# Patient Record
Sex: FEMALE | Race: WHITE | NOT HISPANIC OR LATINO
[De-identification: names, ages, dates, MRNs, and addresses within clinical notes are randomized per-mention and may not be internally consistent; named-entity substitution may affect disease eponyms.]

---

## 2018-08-30 ENCOUNTER — RECORDS - HEALTHEAST (OUTPATIENT)
Dept: ADMINISTRATIVE | Facility: OTHER | Age: 58
End: 2018-08-30

## 2018-08-31 ENCOUNTER — HOSPITAL ENCOUNTER (OUTPATIENT)
Dept: MAMMOGRAPHY | Facility: CLINIC | Age: 58
Discharge: HOME | End: 2018-08-31
Attending: NURSE PRACTITIONER

## 2018-08-31 DIAGNOSIS — N63.0 BREAST LUMP: ICD-10-CM

## 2018-10-30 ASSESSMENT — MIFFLIN-ST. JEOR: SCORE: 1353.33

## 2018-11-02 ENCOUNTER — ANESTHESIA - HEALTHEAST (OUTPATIENT)
Dept: SURGERY | Facility: AMBULATORY SURGERY CENTER | Age: 58
End: 2018-11-02

## 2018-11-05 ENCOUNTER — SURGERY - HEALTHEAST (OUTPATIENT)
Dept: SURGERY | Facility: AMBULATORY SURGERY CENTER | Age: 58
End: 2018-11-05

## 2018-11-05 ASSESSMENT — MIFFLIN-ST. JEOR: SCORE: 1376.01

## 2021-06-01 ENCOUNTER — RECORDS - HEALTHEAST (OUTPATIENT)
Dept: ADMINISTRATIVE | Facility: CLINIC | Age: 61
End: 2021-06-01

## 2021-06-02 VITALS — BODY MASS INDEX: 25.31 KG/M2 | WEIGHT: 167 LBS | HEIGHT: 68 IN

## 2021-06-21 NOTE — ANESTHESIA POSTPROCEDURE EVALUATION
Patient: Rahel Abdul  HYSTEROSCOPY, WITH DILATION AND CURETTAGE with myosure  Anesthesia type: MAC    Patient location: PACU  Last vitals:   Vitals:    11/05/18 0801   BP: 98/54   Pulse: 63   Resp: 16   Temp: 36.2  C (97.1  F)   SpO2: 100%     Post vital signs: stable  Level of consciousness: awake and responds to simple questions  Post-anesthesia pain: pain controlled  Post-anesthesia nausea and vomiting: no  Pulmonary: unassisted, return to baseline  Cardiovascular: stable and blood pressure at baseline  Hydration: adequate  Anesthetic events: no    QCDR Measures:  ASA# 11 - Brenda-op Cardiac Arrest: ASA11B - Patient did NOT experience unanticipated cardiac arrest  ASA# 12 - Brenda-op Mortality Rate: ASA12B - Patient did NOT die  ASA# 13 - PACU Re-Intubation Rate: NA - No ETT / LMA used for case  ASA# 10 - Composite Anes Safety: ASA10A - No serious adverse event    Additional Notes:

## 2021-06-21 NOTE — ANESTHESIA PREPROCEDURE EVALUATION
"Anesthesia Evaluation      Patient summary reviewed   No history of anesthetic complications     Airway   Mallampati: II   Pulmonary - negative ROS and normal exam                          Cardiovascular - negative ROS and normal exam  Rhythm: regular  Rate: normal,         Neuro/Psych    (+) depression, anxiety/panic attacks,     Endo/Other - negative ROS      GI/Hepatic/Renal - negative ROS           Dental - normal exam                        Anesthesia Plan  Planned anesthetic: MAC  Versed/fent  propofol ggt  Decadron/zofran  Patient states tylenol makes her face \"puff up\" so we'll avoid that  ASA 2     Anesthetic plan and risks discussed with: patient and child/children    Post-op plan: routine recovery          "

## 2021-06-21 NOTE — ANESTHESIA CARE TRANSFER NOTE
Last vitals:   Vitals:    11/05/18 0801   BP: 98/54   Pulse: 63   Resp: 16   Temp: 36.2  C (97.1  F)   SpO2: 100%     Patient's level of consciousness is drowsy  Spontaneous respirations: yes  Maintains airway independently: yes  Dentition unchanged: yes  Oropharynx: oropharynx clear of all foreign objects    QCDR Measures:  ASA# 20 - Surgical Safety Checklist: WHO surgical safety checklist completed prior to induction  PQRS# 430 - Adult PONV Prevention: 4558F - Pt received => 2 anti-emetic agents (different classes) preop & intraop  ASA# 8 - Peds PONV Prevention: NA - Not pediatric patient, not GA or 2 or more risk factors NOT present  PQRS# 424 - Brenda-op Temp Management: 4559F - At least one body temp DOCUMENTED => 35.5C or 95.9F within required timeframe  PQRS# 426 - PACU Transfer Protocol: - Transfer of care checklist used  ASA# 14 - Acute Post-op Pain: ASA14B - Patient did NOT experience pain >= 7 out of 10

## 2022-03-22 ENCOUNTER — VIRTUAL VISIT (OUTPATIENT)
Dept: PHARMACY | Facility: PHYSICIAN GROUP | Age: 62
End: 2022-03-22

## 2022-03-22 DIAGNOSIS — F41.9 ANXIETY: Primary | ICD-10-CM

## 2022-03-22 DIAGNOSIS — F32.9 MAJOR DEPRESSIVE DISORDER, REMISSION STATUS UNSPECIFIED, UNSPECIFIED WHETHER RECURRENT: ICD-10-CM

## 2022-03-22 PROCEDURE — 99607 MTMS BY PHARM ADDL 15 MIN: CPT | Performed by: PHARMACIST

## 2022-03-22 PROCEDURE — 99605 MTMS BY PHARM NP 15 MIN: CPT | Performed by: PHARMACIST

## 2022-03-22 NOTE — PROGRESS NOTES
Medication Therapy Management (MTM) Encounter    ASSESSMENT:                            Medication Adherence/Access: No issues identified    Anxiety, Depression: Longer duration of desvenlafaxine needed to determine effectiveness. Given patient's history of medication failure and preference it is appropriate to do pharmacogenetic testing to help guide medication therapy. Current as needed use of alprazolam is appropriate.   Education Provided:  - Potential impact of pharmacokinetic and pharmacodynamic genetic variation on medication metabolism and action  - Reviewed genes tested  - Reviewed what report will look like  - How information may be helpful in guiding treatment  - How results may be useful when reviewing safety of other medications  - Limitations of test results on individual medication experience and other factors that impact medication selection    Patient Consent Form reviewed with patient, patient provided opportunity to ask questions, and confirmed understanding.    PLAN:                            1. You should receive your test kit in the mail from [facility] in the next week. When it arrives follow the instructions to do the cheek swabs and send your sample back in the packaging provided.   2. You can also find more information about the test at [email or web address]  3. Continue to take all of your current medications until we have your results and come up with a plan with your doctor.  4. Once your results are back I will call you to let you know and have you schedule a follow-up with Dr. Madison and I on a Tuesday to review results.     Follow-up: 2-3 weeks when [facility] results are back    SUBJECTIVE/OBJECTIVE:                          Rahel Abdul is a 62 year old female called for an initial visit. She was referred to me from Dr. Madison.    Reason for visit: Anxiety and depression medication management. Discuss pharmacogenetic testing.     Allergies/ADRs: Reviewed in chart  Past Medical  History: Reviewed in chart  Tobacco: She reports that she has never smoked. She has never used smokeless tobacco.  Alcohol: Less than 3 drinks at a time, less than 1 time per week  Caffeine: 2-3 cups coffee/day     Medication Adherence/Access: no issues reported    Anxiety, Depression:   Current medication(s):   desvenlafaxine 50 mg once daily   quetiapine 25 mg twice a day and 50 mg at bedtime  Alprazolam 0.5 mg as needed     She recently stopped citalopram and started desvenlafaxine. She has only been taking for a few days. When she initially started quetiapine this was really helpful. She takes 25 mg morning and afternoon and 50 mg at bedtime. Doesn't always take 3 pm dose. No issues with weight gain. Also takes alprazolam as needed. Notices in fall she uses alprazolam using more. Currently she is having more issues with depression symptoms along with anxiety. She has not had good experience with antidepressant medications working for her in the past. She is interested in doing pharmacogenetic testing to help know which mental health medications and doses may be best for her if she needs to change medication in the future.   Previously tried:  -clonazapam- horrible experience, didn't work well  -bupropion- okay, kind of worked  -pantoprazole- didn't work  -fluoxetine- didn't work  -citalopram- didn't work  -escitalopram        Today's Vitals: There were no vitals taken for this visit.  ----------------    I spent 23 minutes with this patient today. All changes were made via collaborative practice agreement with Ayaka Madison MD. A copy of the visit note was provided to the patient's provider(s).    The patient was sent via secure email at her request a summary of these recommendations.     Christine Gastelum, PharmD, BCACP  Medication Therapy Management Pharmacist  [facility]  Pager: [phone]      Telemedicine Visit Details  Type of service:  Telephone visit  Start Time: 11:00 AM  End Time: 11:23  AM  Originating Location (patient location): Home  Distant Location (provider location):  [facility]     Medication Therapy Recommendations  Major depressive disorder, remission status unspecified, unspecified whether recurrent    Current Medication: QUEtiapine (SEROQUEL) 25 MG tablet   Rationale: Condition refractory to medication - Ineffective medication - Effectiveness   Recommendation: Order Lab   Status: Accepted per CPA   Note: Pharmacogenetic testing

## 2022-03-25 RX ORDER — DESVENLAFAXINE 50 MG/1
50 TABLET, FILM COATED, EXTENDED RELEASE ORAL DAILY
COMMUNITY

## 2022-03-25 RX ORDER — QUETIAPINE FUMARATE 25 MG/1
25 TABLET, FILM COATED ORAL 2 TIMES DAILY
COMMUNITY

## 2022-03-25 RX ORDER — QUETIAPINE FUMARATE 50 MG/1
50 TABLET, FILM COATED ORAL AT BEDTIME
COMMUNITY

## 2022-03-25 RX ORDER — ALPRAZOLAM 0.5 MG
0.5 TABLET ORAL 3 TIMES DAILY PRN
COMMUNITY

## 2022-03-25 NOTE — PATIENT INSTRUCTIONS
Recommendations from today's MTM visit:                                                       1. You should receive your test kit in the mail from [facility] in the next week. When it arrives follow the instructions to do the cheek swabs and send your sample back in the packaging provided.   2. You can also find more information about the test at [email or web address]  3. Continue to take all of your current medications until we have your results and come up with a plan with your doctor.  4. Once your results are back I will call you to let you know and have you schedule a follow-up with Dr. Madison and I on a Tuesday to review results.     Follow-up: 2-3 weeks when [facility] results are back    It was great to speak with you today.  I value your experience and would be very thankful for your time with providing feedback on our clinic survey. You may receive a survey via email or text message in the next few days.     To schedule another MTM appointment, please call the MTM scheduling line at [phone].    My Clinical Pharmacist's contact information:                                                      Please feel free to contact me with any questions or concerns you have.      Christine Gastelum, [place], BCACP  Medication Therapy Management Pharmacist  [facility]  Pager: [phone]

## 2022-07-19 ENCOUNTER — OFFICE VISIT (OUTPATIENT)
Dept: PHARMACY | Facility: PHYSICIAN GROUP | Age: 62
End: 2022-07-19

## 2022-07-19 DIAGNOSIS — F32.9 MAJOR DEPRESSIVE DISORDER, REMISSION STATUS UNSPECIFIED, UNSPECIFIED WHETHER RECURRENT: ICD-10-CM

## 2022-07-19 DIAGNOSIS — Z13.79 ENCOUNTER FOR PHARMACOGENETIC TESTING: ICD-10-CM

## 2022-07-19 DIAGNOSIS — F41.9 ANXIETY: Primary | ICD-10-CM

## 2022-07-19 DIAGNOSIS — E88.89 CYP2D6 INTERMEDIATE METABOLIZER (H): ICD-10-CM

## 2022-07-19 PROCEDURE — 99606 MTMS BY PHARM EST 15 MIN: CPT | Performed by: PHARMACIST

## 2022-07-19 NOTE — PROGRESS NOTES
Medication Therapy Management (MTM) Encounter    ASSESSMENT:                            Medication Adherence/Access: No issues identified    Pharmacogenetic results: Reviewed [employer] results with patient.  Discussed that results will not tell us which drugs will work but can give us some insight into dosing and side effects based on individual metabolism of each medication.   Given current and previous therapy, notable results include:   -No adjustments recommended with current medications  -CYP2D6 intermediate metabolizer, may have increased her risk for side effects with medication trials in the past  -Significantly reduced folic acid conversion to active methylfolate based on MTHFR genotype. Active L-methylfolate plays a role in neurotransmitter synthesis. Studies have shown that L-methylfolate supplement alone or in addition to an antidepressant can help reduce depressive symptoms. One study has shown that depressed patients with variation in MTHFR may benefit more from taking L-methylfolate. L-methylfolate doses used in the studies varied, but 15 mg once daily was most common.     Anxiety, Depression: Stable. She would benefit from continuing current therapy. Constipation is a reported side effect with desvenlafaxine but since medication is helping her symptoms she may benefit from trying Miralax three times a week to see if that helps minimize constipation.     PLAN:                            1. Continue desvenlafaxine 50 mg and quetiapine 25 mg twice a day and 50 mg at bedtime   2. May consider trying l-methylfolate 15 mg once daily supplement if wanted. Typically suggest trying for 3 months and if not noticing any difference stop.   3. Consider trying Miralax 1 dose three times a week to help with constipation    Follow-up: with Dr. Madison in August, [facility] follow-up as needed     SUBJECTIVE/OBJECTIVE:                          Rahel Abdul is a 62 year old female coming in for a co-visit with   [place].     Reason for visit: Anxiety and depression medication follow-up, review pharmacogenetic test results.    Allergies/ADRs: Reviewed in chart  Past Medical History: Reviewed in chart  Tobacco: She reports that she has never smoked. She has never used smokeless tobacco.  Alcohol: Less than 3 drinks at a time, less than 1 time per week  Caffeine: 2-3 cups coffee/day     Medication Adherence/Access: no issues reported    Pharmacogenetic results:               desvenlafaxine- no gene-drug interactions    Anxiety, Depression:   Current medication(s):   desvenlafaxine 50 mg once daily   quetiapine 25 mg twice a day and 50 mg at bedtime   alprazolam 0.5 mg twice a day as needed   She has been on desvenlafaxine for awhile now and this seems to be the best medication she has tried for symptom control. Only side effect she has noticed is constipation. She does eat a high fiber diet but has not tried using anything else to help with constipation. She will skip a desvenlafaxine dose about once a week if she hasn't had a bowel movement and this seems to work. She does not remember this being an issue when she was on quetiapine before starting desvenlafaxine. She takes quetiapine to help with anxiety and sleep, it has been really helpful. She has not had issues with weight gain with it. She uses alprazolam occasionally, typically more in the fall.             Today's Vitals: /76 (BP Location: Right arm, Patient Position: Sitting, Cuff Size: Adult Regular)   Pulse 61   Wt 146 lb (66.2 kg)   BMI 22.20 kg/m    ----------------      I spent 15 minutes with this patient today. All changes were made via collaborative practice agreement with Ayaka Madison MD. A copy of the visit note was provided to the patient's provider(s).    The patient was given a summary of these recommendations.     Christine Gastelum, PharmD, BCACP  Medication Therapy Management Pharmacist  [facility]  Pager: [phone]        Medication Therapy Recommendations  Major depressive disorder, remission status unspecified, unspecified whether recurrent    Current Medication: desvenlafaxine (PRISTIQ) 50 MG 24 hr tablet   Rationale: Undesirable effect - Adverse medication event - Safety   Recommendation: Start Medication - MiraLax 17 GM/SCOOP Powd   Status: Accepted - no CPA Needed

## 2022-07-22 VITALS
BODY MASS INDEX: 22.2 KG/M2 | WEIGHT: 146 LBS | DIASTOLIC BLOOD PRESSURE: 76 MMHG | HEART RATE: 61 BPM | SYSTOLIC BLOOD PRESSURE: 128 MMHG

## 2022-07-22 NOTE — PATIENT INSTRUCTIONS
"Recommendations from today's MTM visit:                                                    MTM (medication therapy management) is a service provided by a clinical pharmacist designed to help you get the most of out of your medicines.      1. Continue desvenlafaxine 50 mg and quetiapine 25 mg twice a day and 50 mg at bedtime   2. May consider trying l-methylfolate 15 mg once daily supplement if wanted. Typically suggest trying for 3 months and if not noticing any difference stop.   3. Consider trying Miralax 1 dose three times a week to help with constipation    Follow-up: with Dr. Madison in August, MTM follow-up as needed     It was great speaking with you today.  I value your experience and would be very thankful for your time in providing feedback in our clinic survey. In the next few days, you may receive an email or text message from [employer] with a link to a survey related to your  clinical pharmacist.\"     To schedule another MTM appointment, please call the MTM scheduling line at [phone].    My Clinical Pharmacist's contact information:                                                      Please feel free to contact me with any questions or concerns you have.      Christine Gastelum, PharmD, BCACP  Medication Therapy Management Pharmacist  [facility]  Pager: [phone]         "

## 2024-10-31 ENCOUNTER — LAB REQUISITION (OUTPATIENT)
Dept: LAB | Facility: CLINIC | Age: 64
End: 2024-10-31

## 2024-10-31 DIAGNOSIS — Z01.419 ENCOUNTER FOR GYNECOLOGICAL EXAMINATION (GENERAL) (ROUTINE) WITHOUT ABNORMAL FINDINGS: ICD-10-CM

## 2024-10-31 PROCEDURE — G0145 SCR C/V CYTO,THINLAYER,RESCR: HCPCS | Performed by: FAMILY MEDICINE

## 2024-10-31 PROCEDURE — 87624 HPV HI-RISK TYP POOLED RSLT: CPT | Performed by: FAMILY MEDICINE

## 2024-11-04 LAB
HPV HR 12 DNA CVX QL NAA+PROBE: NEGATIVE
HPV16 DNA CVX QL NAA+PROBE: NEGATIVE
HPV18 DNA CVX QL NAA+PROBE: NEGATIVE
HUMAN PAPILLOMA VIRUS FINAL DIAGNOSIS: NORMAL

## 2024-11-06 LAB
BKR AP ASSOCIATED HPV REPORT: NORMAL
BKR LAB AP GYN ADEQUACY: NORMAL
BKR LAB AP GYN INTERPRETATION: NORMAL
BKR LAB AP LMP: NORMAL
BKR LAB AP PREVIOUS ABNL DX: NORMAL
BKR LAB AP PREVIOUS ABNORMAL: NORMAL
PATH REPORT.COMMENTS IMP SPEC: NORMAL
PATH REPORT.COMMENTS IMP SPEC: NORMAL
PATH REPORT.RELEVANT HX SPEC: NORMAL